# Patient Record
Sex: FEMALE | Race: ASIAN | Employment: FULL TIME | URBAN - METROPOLITAN AREA
[De-identification: names, ages, dates, MRNs, and addresses within clinical notes are randomized per-mention and may not be internally consistent; named-entity substitution may affect disease eponyms.]

---

## 2018-01-04 ENCOUNTER — OFFICE VISIT (OUTPATIENT)
Dept: FAMILY MEDICINE | Facility: CLINIC | Age: 38
End: 2018-01-04
Payer: COMMERCIAL

## 2018-01-04 VITALS
HEART RATE: 81 BPM | SYSTOLIC BLOOD PRESSURE: 102 MMHG | BODY MASS INDEX: 25.03 KG/M2 | DIASTOLIC BLOOD PRESSURE: 63 MMHG | WEIGHT: 136 LBS | TEMPERATURE: 97.4 F | HEIGHT: 62 IN | OXYGEN SATURATION: 98 %

## 2018-01-04 DIAGNOSIS — Z80.41 FAMILY HISTORY OF MALIGNANT NEOPLASM OF OVARY: ICD-10-CM

## 2018-01-04 DIAGNOSIS — Z12.4 CERVICAL CANCER SCREENING: ICD-10-CM

## 2018-01-04 DIAGNOSIS — Z00.00 ENCOUNTER FOR ROUTINE ADULT HEALTH EXAMINATION WITHOUT ABNORMAL FINDINGS: Primary | ICD-10-CM

## 2018-01-04 DIAGNOSIS — Z23 NEED FOR PROPHYLACTIC VACCINATION AND INOCULATION AGAINST INFLUENZA: ICD-10-CM

## 2018-01-04 DIAGNOSIS — Z30.431 SURVEILLANCE FOR BIRTH CONTROL, INTRAUTERINE DEVICE: ICD-10-CM

## 2018-01-04 DIAGNOSIS — N89.8 VAGINAL DISCHARGE: ICD-10-CM

## 2018-01-04 LAB
ALBUMIN SERPL-MCNC: 3.8 G/DL (ref 3.4–5)
ALP SERPL-CCNC: 36 U/L (ref 40–150)
ALT SERPL W P-5'-P-CCNC: 35 U/L (ref 0–50)
ANION GAP SERPL CALCULATED.3IONS-SCNC: 8 MMOL/L (ref 3–14)
AST SERPL W P-5'-P-CCNC: 13 U/L (ref 0–45)
BILIRUB SERPL-MCNC: 0.3 MG/DL (ref 0.2–1.3)
BUN SERPL-MCNC: 14 MG/DL (ref 7–30)
CALCIUM SERPL-MCNC: 8.3 MG/DL (ref 8.5–10.1)
CHLORIDE SERPL-SCNC: 107 MMOL/L (ref 94–109)
CHOLEST SERPL-MCNC: 171 MG/DL
CO2 SERPL-SCNC: 27 MMOL/L (ref 20–32)
CREAT SERPL-MCNC: 0.48 MG/DL (ref 0.52–1.04)
DEPRECATED CALCIDIOL+CALCIFEROL SERPL-MC: 17 UG/L (ref 20–75)
ERYTHROCYTE [DISTWIDTH] IN BLOOD BY AUTOMATED COUNT: 12.7 % (ref 10–15)
GFR SERPL CREATININE-BSD FRML MDRD: >90 ML/MIN/1.7M2
GLUCOSE SERPL-MCNC: 88 MG/DL (ref 70–99)
HCT VFR BLD AUTO: 40.2 % (ref 35–47)
HDLC SERPL-MCNC: 42 MG/DL
HGB BLD-MCNC: 13.5 G/DL (ref 11.7–15.7)
LDLC SERPL CALC-MCNC: 116 MG/DL
MCH RBC QN AUTO: 31.2 PG (ref 26.5–33)
MCHC RBC AUTO-ENTMCNC: 33.6 G/DL (ref 31.5–36.5)
MCV RBC AUTO: 93 FL (ref 78–100)
NONHDLC SERPL-MCNC: 129 MG/DL
PLATELET # BLD AUTO: 286 10E9/L (ref 150–450)
POTASSIUM SERPL-SCNC: 4.4 MMOL/L (ref 3.4–5.3)
PROT SERPL-MCNC: 7.4 G/DL (ref 6.8–8.8)
RBC # BLD AUTO: 4.33 10E12/L (ref 3.8–5.2)
SODIUM SERPL-SCNC: 142 MMOL/L (ref 133–144)
SPECIMEN SOURCE: NORMAL
TRIGL SERPL-MCNC: 66 MG/DL
WBC # BLD AUTO: 4.9 10E9/L (ref 4–11)
WET PREP SPEC: NORMAL

## 2018-01-04 PROCEDURE — G0145 SCR C/V CYTO,THINLAYER,RESCR: HCPCS | Performed by: NURSE PRACTITIONER

## 2018-01-04 PROCEDURE — 87624 HPV HI-RISK TYP POOLED RSLT: CPT | Performed by: NURSE PRACTITIONER

## 2018-01-04 PROCEDURE — 90471 IMMUNIZATION ADMIN: CPT | Performed by: NURSE PRACTITIONER

## 2018-01-04 PROCEDURE — 80061 LIPID PANEL: CPT | Performed by: NURSE PRACTITIONER

## 2018-01-04 PROCEDURE — 87210 SMEAR WET MOUNT SALINE/INK: CPT | Performed by: NURSE PRACTITIONER

## 2018-01-04 PROCEDURE — 82306 VITAMIN D 25 HYDROXY: CPT | Performed by: NURSE PRACTITIONER

## 2018-01-04 PROCEDURE — 80053 COMPREHEN METABOLIC PANEL: CPT | Performed by: NURSE PRACTITIONER

## 2018-01-04 PROCEDURE — 90686 IIV4 VACC NO PRSV 0.5 ML IM: CPT | Performed by: NURSE PRACTITIONER

## 2018-01-04 PROCEDURE — 36415 COLL VENOUS BLD VENIPUNCTURE: CPT | Performed by: NURSE PRACTITIONER

## 2018-01-04 PROCEDURE — 85027 COMPLETE CBC AUTOMATED: CPT | Performed by: NURSE PRACTITIONER

## 2018-01-04 PROCEDURE — 99395 PREV VISIT EST AGE 18-39: CPT | Mod: 25 | Performed by: NURSE PRACTITIONER

## 2018-01-04 NOTE — LETTER
January 11, 2018    Jacqui Walters  [address] [place] [address]    Dear Jacqui,  We are happy to inform you that your PAP smear result from 1/4/18 is normal.  We are now able to do a follow up test on PAP smears. The DNA test is for HPV (Human Papilloma Virus). Cervical cancer is closely linked with certain types of HPV. Your result showed no evidence of high risk HPV.  Therefore we recommend you return in 5 years for your next pap smear and HPV test.  You will still need to return to the clinic every year for an annual exam and other preventive tests.  Please contact the clinic at [phone] with any questions.  Sincerely,    PAM Shah CNP/rlm

## 2018-01-04 NOTE — PATIENT INSTRUCTIONS
At [facility], we strive to deliver an exceptional experience to you, every time we see you.  If you receive a survey in the mail, please send us back your thoughts. We really do value your feedback.    Based on your medical history, these are the current health maintenance/preventive care services that you are due for (some may have been done at this visit.)  Health Maintenance Due   Topic Date Due     EYE EXAM Q1 YEAR  04/15/2017     INFLUENZA VACCINE (SYSTEM ASSIGNED)  09/01/2017     PAP Q5 YEARS  12/19/2017     HPV Q5 YEARS (Complete with PAP)  12/19/2017         Suggested websites for health information:  Www.[place].org : Up to date and easily searchable information on multiple topics.  Www.medlineplus.gov : medication info, interactive tutorials, watch real surgeries online  Www.familydoctor.org : good info from the Academy of Family Physicians  Www.cdc.gov : public health info, travel advisories, epidemics (H1N1)  Www.aap.org : children's health info, normal development, vaccinations  Www.health.[facility].us : [place] dept of health, public health issues in [place], N1N1    Your care team:                            Family Medicine Internal Medicine   MD Manuel Mejia MD Shantel Branch-Fleming, MD Katya Georgiev PA-C Nam Ho, MD Pediatrics   DELMER Pavon, MD Kayli Johnson CNP, MD Deborah Mielke, MD Kim Thein, APRN CNP      Clinic hours: Monday - Thursday 7 am-7 pm; Fridays 7 am-5 pm.   Urgent care: Monday - Friday 11 am-9 pm; Saturday and Sunday 9 am-5 pm.  Pharmacy : Monday -Thursday 8 am-8 pm; Friday 8 am-6 pm; Saturday and Sunday 9 am-5 pm.     Clinic: [phone]   Pharmacy: [phone]      Preventive Health Recommendations  Female Ages 26 - 39  Yearly exam:   See your health care provider every year in order to    Review health changes.     Discuss preventive care.      Review your medicines  if you your doctor has prescribed any.    Until age 30: Get a Pap test every three years (more often if you have had an abnormal result).    After age 30: Talk to your doctor about whether you should have a Pap test every 3 years or have a Pap test with HPV screening every 5 years.   You do not need a Pap test if your uterus was removed (hysterectomy) and you have not had cancer.  You should be tested each year for STDs (sexually transmitted diseases), if you're at risk.   Talk to your provider about how often to have your cholesterol checked.  If you are at risk for diabetes, you should have a diabetes test (fasting glucose).  Shots: Get a flu shot each year. Get a tetanus shot every 10 years.   Nutrition:     Eat at least 5 servings of fruits and vegetables each day.    Eat whole-grain bread, whole-wheat pasta and brown rice instead of white grains and rice.    Talk to your provider about Calcium and Vitamin D.     Lifestyle    Exercise at least 150 minutes a week (30 minutes a day, 5 days of the week). This will help you control your weight and prevent disease.    Limit alcohol to one drink per day.    No smoking.     Wear sunscreen to prevent skin cancer.    See your dentist every six months for an exam and cleaning.

## 2018-01-04 NOTE — MR AVS SNAPSHOT
After Visit Summary   1/4/2018    Jacqui Walters    MRN: [id number]           Patient Information     Date Of Birth          1980        Visit Information        Provider Department      1/4/2018 6:45 AM Salome Russ, PAM GLORIA; [employer] [facility]        Today's Diagnoses     Need for prophylactic vaccination and inoculation against influenza    -  1    Encounter for routine adult health examination without abnormal findings        Cervical cancer screening        Vaginal discharge          Care Instructions    At [facility], we strive to deliver an exceptional experience to you, every time we see you.  If you receive a survey in the mail, please send us back your thoughts. We really do value your feedback.    Based on your medical history, these are the current health maintenance/preventive care services that you are due for (some may have been done at this visit.)  Health Maintenance Due   Topic Date Due     EYE EXAM Q1 YEAR  04/15/2017     INFLUENZA VACCINE (SYSTEM ASSIGNED)  09/01/2017     PAP Q5 YEARS  12/19/2017     HPV Q5 YEARS (Complete with PAP)  12/19/2017         Suggested websites for health information:  Www.[employer] : Up to date and easily searchable information on multiple topics.  Www.medlineplus.gov : medication info, interactive tutorials, watch real surgeries online  Www.familydoctor.org : good info from the Academy of Family Physicians  Www.cdc.gov : public health info, travel advisories, epidemics (H1N1)  Www.aap.org : children's health info, normal development, vaccinations  Www.health.[facility].[place].us : [place] dept of health, public health issues in [place], N1N1    Your care team:                            Family Medicine Internal Medicine   MD Manuel Mejia MD Shantel Branch-Fleming, MD Katya Georgiev PA-C Nam Ho, MD Pediatrics   DELMER Pavon, FAIZAN Jackson  MD Kayli Suggs CNP, MD Deborah Mielke, MD Kim Thein, APRN CNP      Clinic hours: Monday - Thursday 7 am-7 pm; Fridays 7 am-5 pm.   Urgent care: Monday - Friday 11 am-9 pm; Saturday and Sunday 9 am-5 pm.  Pharmacy : Monday -Thursday 8 am-8 pm; Friday 8 am-6 pm; Saturday and Sunday 9 am-5 pm.     Clinic: [phone]   Pharmacy: [phone]      Preventive Health Recommendations  Female Ages 26 - 39  Yearly exam:   See your health care provider every year in order to    Review health changes.     Discuss preventive care.      Review your medicines if you your doctor has prescribed any.    Until age 30: Get a Pap test every three years (more often if you have had an abnormal result).    After age 30: Talk to your doctor about whether you should have a Pap test every 3 years or have a Pap test with HPV screening every 5 years.   You do not need a Pap test if your uterus was removed (hysterectomy) and you have not had cancer.  You should be tested each year for STDs (sexually transmitted diseases), if you're at risk.   Talk to your provider about how often to have your cholesterol checked.  If you are at risk for diabetes, you should have a diabetes test (fasting glucose).  Shots: Get a flu shot each year. Get a tetanus shot every 10 years.   Nutrition:     Eat at least 5 servings of fruits and vegetables each day.    Eat whole-grain bread, whole-wheat pasta and brown rice instead of white grains and rice.    Talk to your provider about Calcium and Vitamin D.     Lifestyle    Exercise at least 150 minutes a week (30 minutes a day, 5 days of the week). This will help you control your weight and prevent disease.    Limit alcohol to one drink per day.    No smoking.     Wear sunscreen to prevent skin cancer.    See your dentist every six months for an exam and cleaning.            Follow-ups after your visit        Who to contact     If you have questions or need follow up information  "about today's clinic visit or your schedule please contact [facility] [address] [place] directly at [phone].  Normal or non-critical lab and imaging results will be communicated to you by Bitcoin Brothershart, letter or phone within 4 business days after the clinic has received the results. If you do not hear from us within 7 days, please contact the clinic through Bitcoin Brothershart or phone. If you have a critical or abnormal lab result, we will notify you by phone as soon as possible.  Submit refill requests through [employer] or call your pharmacy and they will forward the refill request to us. Please allow 3 business days for your refill to be completed.          Additional Information About Your Visit        Bitcoin BrothersharE-Generator Information     [employer] lets you send messages to your doctor, view your test results, renew your prescriptions, schedule appointments and more. To sign up, go to [email or web address] . Click on \"Log in\" on the left side of the screen, which will take you to the Welcome page. Then click on \"Sign up Now\" on the right side of the page.     You will be asked to enter the access code listed below, as well as some personal information. Please follow the directions to create your username and password.     Your access code is: [email or web address]  Expires: 2018  7:26 AM     Your access code will  in 90 days. If you need help or a new code, please call your [place] clinic or [phone].        Care EveryWhere ID     This is your Care EveryWhere ID. This could be used by other organizations to access your [place] medical records  [id number]        Your Vitals Were     Pulse Temperature Height Pulse Oximetry Breastfeeding? BMI (Body Mass Index)    81 97.4  F (36.3  C) (Tympanic) 5' 2.17\" (1.579 m) 98% No 24.74 kg/m2       Blood Pressure from Last 3 Encounters:   18 102/63   16 90/60   07/03/15 105/59    Weight from Last 3 Encounters:   18 136 lb (61.7 kg)   16 133 lb (60.3 kg)   07/03/15 128 " lb (58.1 kg)              We Performed the Following     CBC with platelets     Comprehensive metabolic panel     FLU VAC, SPLIT VIRUS IM > 3 YO (QUADRIVALENT) [24766]     FLU Vaccine, 3 YRS +, Quadrivalent     HPV High Risk Types DNA Cervical     Lipid panel reflex to direct LDL Fasting     Pap imaged thin layer screen with HPV - recommended age 30 - 65 years (select HPV order below)     Vaccine Administration, Initial [52810]     Vitamin D Deficiency     Wet prep        Primary Care Provider Office Phone # Fax #    Elvia Larios Alfie Armendariz [phone]       [address]  [facility] [address]        Equal Access to Services     [facility]: Hadii aad ku hadasho Soomaali, waaxda luqadaha, qaybta kaalmada adeegyada, waxay melanyin hayaan bia varghese . So [facility] [phone].    ATENCIÓN: Si habla español, tiene a blas disposición servicios gratuitos de asistencia lingüística. Llame al [phone].    We comply with applicable federal civil rights laws and [place] laws. We do not discriminate on the basis of race, color, national origin, age, disability, sex, sexual orientation, or gender identity.            Thank you!     Thank you for choosing [facility]  for your care. Our goal is always to provide you with excellent care. Hearing back from our patients is one way we can continue to improve our services. Please take a few minutes to complete the written survey that you may receive in the mail after your visit with us. Thank you!             Your Updated Medication List - Protect others around you: Learn how to safely use, store and throw away your medicines at www.disposemymeds.org.          This list is accurate as of: 1/4/18  7:26 AM.  Always use your most recent med list.                   Brand Name Dispense Instructions for use Diagnosis    fluorometholone 0.1 % ophthalmic susp    FML LIQUIFILM          levocetirizine 5 MG tablet    XYZAL    30  tablet    TAKE 1 TABLET BY MOUTH EVERY EVENING    Non-seasonal allergic rhinitis       paragard intrauterine copper     1 each    1 each by Intrauterine route daily    Routine general medical examination at a health care facility       RESTASIS 0.05 % ophthalmic emulsion   Generic drug:  cycloSPORINE      INT 1 GTT IN OU BID

## 2018-01-04 NOTE — PROGRESS NOTES
SUBJECTIVE:   CC: Jacqui Walters is an 37 year old woman who presents for preventive health visit.     Healthy Habits:    Do you get at least three servings of calcium containing foods daily (dairy, green leafy vegetables, etc.)? yes    Amount of exercise or daily activities, outside of work:None     Problems taking medications regularly No    Medication side effects: No    Have you had an eye exam in the past two years? no    Do you see a dentist twice per year? yes    Do you have sleep apnea, excessive snoring or daytime drowsiness?no    Health history update: Sister recently passed away from ovarian cancer which was discovered at age 40 at an advanced stage.  No other family history of female cancers, colon, or lung cancers.    Patient has IUD- paragaurd placed 2014, likes method.  She denies breakthrough bleeding or very heavy painful periods but cycles are very short (21 days).    perdio every 21 days.        Today's PHQ-2 Score: PHQ-2 ( 1999 Pfizer) 1/4/2018 8/19/2016   Q1: Little interest or pleasure in doing things 0 0   Q2: Feeling down, depressed or hopeless 0 0   PHQ-2 Score 0 0         Abuse: Current or Past(Physical, Sexual or Emotional)- No  Do you feel safe in your environment - No    Social History   Substance Use Topics     Smoking status: Never Smoker     Smokeless tobacco: Never Used     Alcohol use No     If you drink alcohol do you typically have >3 drinks per day or >7 drinks per week? No                     Reviewed orders with patient.  Reviewed health maintenance and updated orders accordingly - Yes  Labs reviewed in EPIC  BP Readings from Last 3 Encounters:   01/04/18 102/63   08/19/16 90/60   07/03/15 105/59    Wt Readings from Last 3 Encounters:   01/04/18 136 lb (61.7 kg)   08/19/16 133 lb (60.3 kg)   07/03/15 128 lb (58.1 kg)                  Patient Active Problem List   Diagnosis     CARDIOVASCULAR SCREENING; LDL GOAL LESS THAN 160     Cough     House dust mite allergy     Diagnostic  skin and sensitization tests (aka ALLERGENS)     Past Surgical History:   Procedure Laterality Date     HC INSERTION INTRAUTERINE DEVICE  2005, 2013    Paragard     HC REMOVE INTRAUTERINE DEVICE  2011       Social History   Substance Use Topics     Smoking status: Never Smoker     Smokeless tobacco: Never Used     Alcohol use No     Family History   Problem Relation Age of Onset     Hypertension Mother      Ovarian Cancer Sister 40     passed away from this     CANCER No family hx of      DIABETES No family hx of      CEREBROVASCULAR DISEASE No family hx of      Thyroid Disease No family hx of      Glaucoma No family hx of      Macular Degeneration No family hx of          Current Outpatient Prescriptions   Medication Sig Dispense Refill     paragard intrauterine copper 1 each by Intrauterine route daily 1 each      levocetirizine (XYZAL) 5 MG tablet TAKE 1 TABLET BY MOUTH EVERY EVENING (Patient not taking: Reported on 1/4/2018) 30 tablet 3     fluorometholone (FML LIQUIFILM) 0.1 % ophthalmic suspension   0     RESTASIS 0.05 % ophthalmic emulsion INT 1 GTT IN OU BID  4     No Known Allergies  Recent Labs   Lab Test  08/19/16   0847  09/03/14   0826  07/30/14   0817   LDL  85   --   66   HDL  39*   --   49*   TRIG  77   --   94   ALT   --    --   21   TSH   --   1.59   --               Mammogram not appropriate for this patient based on age.    Pertinent mammograms are reviewed under the imaging tab.  History of abnormal Pap smear: NO - age 30-65 PAP every 5 years with negative HPV co-testing recommended    Reviewed and updated as needed this visit by clinical staff         Reviewed and updated as needed this visit by Provider        Past Medical History:   Diagnosis Date     Diagnostic skin and sensitization tests (aka ALLERGENS) 1/13/15 IgE tests pos. only to DM (all other environmental allergens were NEGATIVE)     House dust mite allergy     1/13/15 IgE tests pos. only to DM (all other environmental allergens were  NEGATIVE)      Past Surgical History:   Procedure Laterality Date     HC INSERTION INTRAUTERINE DEVICE  ,     Paragard     HC REMOVE INTRAUTERINE DEVICE       Obstetric History       T2      L1     SAB0   TAB0   Ectopic0   Multiple0   Live Births1       # Outcome Date GA Lbr Daniele/2nd Weight Sex Delivery Anes PTL Lv   2 Term 12 39w5d 12:00 8 lb 12 oz (3.969 kg) M  Local        Name: Adma      Apgar1:  8                Apgar5: 9   1 Term  41w0d 04:00 7 lb (3.175 kg) F  None  [facility]      Name: Randi          ROS:  C: NEGATIVE for fever, chills, change in weight  I: NEGATIVE for worrisome rashes, moles or lesions  E: NEGATIVE for vision changes or irritation  ENT: NEGATIVE for ear, mouth and throat problems  R: NEGATIVE for significant cough or SOB  B: NEGATIVE for masses, tenderness or discharge  CV: NEGATIVE for chest pain, palpitations or peripheral edema  GI: NEGATIVE for nausea, abdominal pain, heartburn, or change in bowel habits  : NEGATIVE for unusual urinary or vaginal symptoms. Periods are regular.  M: NEGATIVE for significant arthralgias or myalgia  N: NEGATIVE for weakness, dizziness or paresthesias  E: NEGATIVE for temperature intolerance, skin/hair changes  H: NEGATIVE for bleeding problems  P: NEGATIVE for changes in mood or affect    OBJECTIVE:   There were no vitals taken for this visit.  EXAM:  GENERAL: healthy, alert and no distress  EYES: Eyes grossly normal to inspection, PERRL and conjunctivae and sclerae normal  HENT: ear canals and TM's normal, nose and mouth without ulcers or lesions  NECK: no adenopathy, no asymmetry, masses, or scars and thyroid normal to palpation  RESP: lungs clear to auscultation - no rales, rhonchi or wheezes  BREAST: normal without masses, tenderness or nipple discharge and no palpable axillary masses or adenopathy  CV: regular rate and rhythm, normal S1 S2, no S3 or S4, no murmur, click or rub, no peripheral edema and  "peripheral pulses strong  ABDOMEN: soft, nontender, no hepatosplenomegaly, no masses and bowel sounds normal   (female): normal female external genitalia, normal urethral meatus, vaginal mucosa pink, moist, well rugated, and normal cervix/adnexa/uterus without masses; small amt of clear, non malodorous, thin discharge; white strings 3 cm in length visualized at os  MS: no gross musculoskeletal defects noted, no edema  SKIN: no suspicious lesions or rashes  NEURO: Normal strength and tone, mentation intact and speech normal  PSYCH: mentation appears normal, affect normal/bright    ASSESSMENT/PLAN:   1. Encounter for routine adult health examination without abnormal findings  Normal exam  - Lipid panel reflex to direct LDL Fasting  - Vitamin D Deficiency  - Comprehensive metabolic panel  - CBC with platelets    2. Vaginal discharge  Negative wet prep.  - Wet prep    3. Surveillance for birth control, intrauterine device  strings visualized at os, 3cm in length    4. Family history of malignant neoplasm of ovary  In sister, diagnosed at age 40 at an advanced stage.    5. Cervical cancer screening  Pap done today.  - Pap imaged thin layer screen with HPV - recommended age 30 - 65 years (select HPV order below)  - HPV High Risk Types DNA Cervical    6. Need for prophylactic vaccination and inoculation against influenza  Given.  - FLU VAC, SPLIT VIRUS IM > 3 YO (QUADRIVALENT) [91520]  - Vaccine Administration, Initial [16975]    COUNSELING:   Reviewed preventive health counseling, as reflected in patient instructions       Regular exercise       Healthy diet/nutrition       Immunizations    Vaccinated for: Influenza               reports that she has never smoked. She has never used smokeless tobacco.    Estimated body mass index is 24.33 kg/(m^2) as calculated from the following:    Height as of 8/19/16: 5' 2\" (1.575 m).    Weight as of 8/19/16: 133 lb (60.3 kg).   Weight management plan: Discussed healthy diet and " exercise guidelines and patient will follow up in 12 months in clinic to re-evaluate.    Counseling Resources:  ATP IV Guidelines  Pooled Cohorts Equation Calculator  Breast Cancer Risk Calculator  FRAX Risk Assessment  ICSI Preventive Guidelines  Dietary Guidelines for Americans, 2010  USDA's MyPlate  ASA Prophylaxis  Lung CA Screening    [facility]  Injectable Influenza Immunization Documentation    1.  Is the person to be vaccinated sick today?   No    2. Does the person to be vaccinated have an allergy to a component   of the vaccine?   No  Egg Allergy Algorithm Link    3. Has the person to be vaccinated ever had a serious reaction   to influenza vaccine in the past?   No    4. Has the person to be vaccinated ever had Guillain-Barré syndrome?   No    Form completed by Charles Campos

## 2018-01-04 NOTE — NURSING NOTE
"Chief Complaint   Patient presents with     Physical       Initial /63 (BP Location: Left arm, Patient Position: Sitting, Cuff Size: Adult Regular)  Pulse 81  Temp 97.4  F (36.3  C) (Tympanic)  Ht 5' 2.17\" (1.579 m)  Wt 136 lb (61.7 kg)  SpO2 98%  Breastfeeding? No  BMI 24.74 kg/m2 Estimated body mass index is 24.74 kg/(m^2) as calculated from the following:    Height as of this encounter: 5' 2.17\" (1.579 m).    Weight as of this encounter: 136 lb (61.7 kg).  Medication Reconciliation: complete   Raudel VILLARREAL      "

## 2018-01-06 LAB
COPATH REPORT: NORMAL
PAP: NORMAL

## 2018-01-08 DIAGNOSIS — E55.9 VITAMIN D DEFICIENCY: Primary | ICD-10-CM

## 2018-01-09 LAB
FINAL DIAGNOSIS: NORMAL
HPV HR 12 DNA CVX QL NAA+PROBE: NEGATIVE
HPV16 DNA SPEC QL NAA+PROBE: NEGATIVE
HPV18 DNA SPEC QL NAA+PROBE: NEGATIVE
SPECIMEN DESCRIPTION: NORMAL

## 2018-05-21 ENCOUNTER — OFFICE VISIT (OUTPATIENT)
Dept: FAMILY MEDICINE | Facility: CLINIC | Age: 38
End: 2018-05-21
Payer: COMMERCIAL

## 2018-05-21 VITALS
WEIGHT: 138.6 LBS | HEART RATE: 87 BPM | DIASTOLIC BLOOD PRESSURE: 70 MMHG | SYSTOLIC BLOOD PRESSURE: 108 MMHG | OXYGEN SATURATION: 97 % | BODY MASS INDEX: 25.51 KG/M2 | RESPIRATION RATE: 20 BRPM | TEMPERATURE: 98.6 F | HEIGHT: 62 IN

## 2018-05-21 DIAGNOSIS — E55.9 VITAMIN D DEFICIENCY: Primary | ICD-10-CM

## 2018-05-21 LAB — DEPRECATED CALCIDIOL+CALCIFEROL SERPL-MC: 47 UG/L (ref 20–75)

## 2018-05-21 PROCEDURE — 99213 OFFICE O/P EST LOW 20 MIN: CPT | Performed by: FAMILY MEDICINE

## 2018-05-21 PROCEDURE — 82306 VITAMIN D 25 HYDROXY: CPT | Performed by: FAMILY MEDICINE

## 2018-05-21 PROCEDURE — 36415 COLL VENOUS BLD VENIPUNCTURE: CPT | Performed by: FAMILY MEDICINE

## 2018-05-21 RX ORDER — CHLORAL HYDRATE 500 MG
1 CAPSULE ORAL DAILY
COMMUNITY

## 2018-05-21 ASSESSMENT — PAIN SCALES - GENERAL: PAINLEVEL: NO PAIN (0)

## 2018-05-21 NOTE — PATIENT INSTRUCTIONS
At [facility], we strive to deliver an exceptional experience to you, every time we see you.  If you receive a survey in the mail, please send us back your thoughts. We really do value your feedback.    Based on your medical history, these are the current health maintenance/preventive care services that you are due for (some may have been done at this visit.)  Health Maintenance Due   Topic Date Due     EYE EXAM Q1 YEAR  04/15/2017       Suggested websites for health information:  Www.[facility].org : Up to date and easily searchable information on multiple topics.  Www.medlineplus.gov : medication info, interactive tutorials, watch real surgeries online  Www.familydoctor.org : good info from the Academy of Family Physicians  Www.cdc.gov : public health info, travel advisories, epidemics (H1N1)  Www.aap.org : children's health info, normal development, vaccinations  Www.health.[facility].[place].us : [place] dept of health, public health issues in [place], N1N1    Your care team:                            Family Medicine Internal Medicine   MD Manuel Mejia MD Shantel Branch-Fleming, MD Katya Georgiev PA-C Megan Hill, APRN CNP    Ethan Canales MD Pediatrics   Kalpesh Chavez, PA-C  Salome Russ, CNP MD Samantha Moura APRN CNP   MD Kayli Smyth MD Deborah Mielke, MD Kim Thein, APRN CNP      Clinic hours: Monday - Thursday 7 am-7 pm; Fridays 7 am-5 pm.   Urgent care: Monday - Friday 11 am-9 pm; Saturday and Sunday 9 am-5 pm.  Pharmacy : Monday -Thursday 8 am-8 pm; Friday 8 am-6 pm; Saturday and Sunday 9 am-5 pm.     Clinic: [phone]   Pharmacy: [phone]

## 2018-05-21 NOTE — MR AVS SNAPSHOT
After Visit Summary   5/21/2018    Jacqui Walters    MRN: [id number]           Patient Information     Date Of Birth          1980        Visit Information        Provider Department      5/21/2018 9:05 AM Elvia Martins MD; [employer] [facility]        Today's Diagnoses     Vitamin D deficiency    -  1      Care Instructions    At [facility], we strive to deliver an exceptional experience to you, every time we see you.  If you receive a survey in the mail, please send us back your thoughts. We really do value your feedback.    Based on your medical history, these are the current health maintenance/preventive care services that you are due for (some may have been done at this visit.)  Health Maintenance Due   Topic Date Due     EYE EXAM Q1 YEAR  04/15/2017       Suggested websites for health information:  Www.Face++ : Up to date and easily searchable information on multiple topics.  Www.Validus-IVC.gov : medication info, interactive tutorials, watch real surgeries online  Www.familydoctor.org : good info from the Academy of Family Physicians  Www.cdc.gov : public health info, travel advisories, epidemics (H1N1)  Www.aap.org : children's health info, normal development, vaccinations  Www.health.state.[place].us : [place] dept of health, public health issues in [place], N1N1    Your care team:                            Family Medicine Internal Medicine   MD Manuel Mejia MD Shantel Branch-Fleming, MD Katya Georgiev PA-C Megan Hill, PAM Canales MD Pediatrics   DELMER Pavon, MD Samantha Sims APRN CNP   MD Kayli Smyth MD Deborah Mielke, MD Kim Thein, APRN CNP      Clinic hours: Monday - Thursday 7 am-7 pm; Fridays 7 am-5 pm.   Urgent care: Monday - Friday 11 am-9 pm; Saturday and Sunday 9 am-5 pm.  Pharmacy : Monday -Thursday 8 am-8 pm; Friday 8  "am-6 pm; Saturday and  9 am-5 pm.     Clinic: [phone]   Pharmacy: [phone]             Follow-ups after your visit        Who to contact     If you have questions or need follow up information about today's clinic visit or your schedule please contact [facility] directly at [phone].  Normal or non-critical lab and imaging results will be communicated to you by MyChart, letter or phone within 4 business days after the clinic has received the results. If you do not hear from us within 7 days, please contact the clinic through [employer] or phone. If you have a critical or abnormal lab result, we will notify you by phone as soon as possible.  Submit refill requests through [employer] or call your pharmacy and they will forward the refill request to us. Please allow 3 business days for your refill to be completed.          Additional Information About Your Visit        [facility] [employer] Information     [employer] lets you send messages to your doctor, view your test results, renew your prescriptions, schedule appointments and more. To sign up, go to [email or web address] . Click on \"Log in\" on the left side of the screen, which will take you to the Welcome page. Then click on \"Sign up Now\" on the right side of the page.     You will be asked to enter the access code listed below, as well as some personal information. Please follow the directions to create your username and password.     Your access code is: [email or web address]  Expires: 2018  9:40 AM     Your access code will  in 90 days. If you need help or a new code, please call your [facility] clinic or [phone].        Care EveryWhere ID     This is your Care EveryWhere ID. This could be used by other organizations to access your [facility] medical records  [id number]        Your Vitals Were     Pulse Temperature Respirations Height Pulse Oximetry Breastfeeding?    87 98.6  F (37  C) (Oral) 20 5' 2.17\" (1.579 m) 97% No    BMI (Body " Mass Index)                   25.22 kg/m2            Blood Pressure from Last 3 Encounters:   05/21/18 108/70   01/04/18 102/63   08/19/16 90/60    Weight from Last 3 Encounters:   05/21/18 138 lb 9.6 oz (62.9 kg)   01/04/18 136 lb (61.7 kg)   08/19/16 133 lb (60.3 kg)              We Performed the Following     Vitamin D Deficiency        Primary Care Provider Office Phone # Fax Ivania Larios Alfie Armendariz [phone]       [address]  [facility] [address]        Equal Access to Services     [facility]: Hadii aad ku hadasho Soomaali, waaxda luqadaha, qaybta kaalmada adeegyada, waxromain varghese . So [facility] [phone].    ATENCIÓN: Si habla español, tiene a blas disposición servicios gratuitos de asistencia lingüística. Llame al [phone].    We comply with applicable federal civil rights laws and [place] laws. We do not discriminate on the basis of race, color, national origin, age, disability, sex, sexual orientation, or gender identity.            Thank you!     Thank you for choosing [facility]  for your care. Our goal is always to provide you with excellent care. Hearing back from our patients is one way we can continue to improve our services. Please take a few minutes to complete the written survey that you may receive in the mail after your visit with us. Thank you!             Your Updated Medication List - Protect others around you: Learn how to safely use, store and throw away your medicines at www.disposemymeds.org.          This list is accurate as of 5/21/18  9:40 AM.  Always use your most recent med list.                   Brand Name Dispense Instructions for use Diagnosis    fish oil-omega-3 fatty acids 1000 MG capsule      Take 1 g by mouth daily        fluorometholone 0.1 % ophthalmic susp    FML LIQUIFILM          levocetirizine 5 MG tablet    XYZAL    30 tablet    TAKE 1 TABLET BY MOUTH EVERY EVENING     Non-seasonal allergic rhinitis       paragard intrauterine copper     1 each    1 each by Intrauterine route daily    Routine general medical examination at a health care facility       RESTASIS 0.05 % ophthalmic emulsion   Generic drug:  cycloSPORINE      INT 1 GTT IN OU BID

## 2018-05-21 NOTE — PROGRESS NOTES
SUBJECTIVE:   Jacqui Walters is a 37 year old female who presents to clinic today for the following health issues:    Follow up Vitamin D: Took the weekly 50,000 units but did not start daily supplement to follow.    Hyperlipidemia Follow-Up      Rate your low fat/cholesterol diet?: good    Taking statin?  No    Other lipid medications/supplements?:  Fish oil/Omega 3, dose none without side effects      Amount of exercise or physical activity: None    Problems taking medications regularly: No, not taking any medications    Medication side effects: none    Diet: regular (no restrictions)        Problem list and histories reviewed & adjusted, as indicated.  Additional history: as documented    Patient Active Problem List   Diagnosis     CARDIOVASCULAR SCREENING; LDL GOAL LESS THAN 160     Cough     House dust mite allergy     Diagnostic skin and sensitization tests (aka ALLERGENS)     Family history of malignant neoplasm of ovary     Vitamin D deficiency     Past Surgical History:   Procedure Laterality Date     HC INSERTION INTRAUTERINE DEVICE  2005, 2013    Paragard     HC REMOVE INTRAUTERINE DEVICE  2011       Social History   Substance Use Topics     Smoking status: Never Smoker     Smokeless tobacco: Never Used     Alcohol use No     Family History   Problem Relation Age of Onset     Hypertension Mother      Ovarian Cancer Sister 40     passed away from this     CANCER No family hx of      DIABETES No family hx of      CEREBROVASCULAR DISEASE No family hx of      Thyroid Disease No family hx of      Glaucoma No family hx of      Macular Degeneration No family hx of            Reviewed and updated as needed this visit by clinical staff  Tobacco  Allergies  Meds  Problems       Reviewed and updated as needed this visit by Provider  Allergies  Meds  Problems         ROS:  Constitutional, HEENT, cardiovascular, pulmonary, gi and gu systems are negative, except as otherwise noted.    OBJECTIVE:     /70  "(BP Location: Left arm, Patient Position: Chair, Cuff Size: Adult Regular)  Pulse 87  Temp 98.6  F (37  C) (Oral)  Resp 20  Ht 5' 2.17\" (1.579 m)  Wt 138 lb 9.6 oz (62.9 kg)  SpO2 97%  Breastfeeding? No  BMI 25.22 kg/m2  Body mass index is 25.22 kg/(m^2).  GENERAL: healthy, alert and no distress  NECK: no adenopathy, no asymmetry, masses, or scars and thyroid normal to palpation  RESP: lungs clear to auscultation - no rales, rhonchi or wheezes  CV: regular rate and rhythm, normal S1 S2, no S3 or S4, no murmur, click or rub, no peripheral edema and peripheral pulses strong  ABDOMEN: soft, nontender, no hepatosplenomegaly, no masses and bowel sounds normal  MS: no gross musculoskeletal defects noted, no edema  PSYCH: mentation appears normal, affect normal/bright    Diagnostic Test Results:  none     ASSESSMENT/PLAN:     1. Vitamin D deficiency  Uncertain control/resolution - recheck and treatment as indicated.  - Vitamin D Deficiency      Elvia Armendariz MD  [facility]    "

## 2018-05-22 NOTE — PROGRESS NOTES
Ms. Walters,    Your vitamin D level is normal.  Please take vitamin D3 1000 units daily for here on.    Please contact the clinic if you have additional questions.  Thank you.    Sincerely,    Elvia Armendariz

## 2019-06-20 ENCOUNTER — OFFICE VISIT (OUTPATIENT)
Dept: FAMILY MEDICINE | Facility: CLINIC | Age: 39
End: 2019-06-20
Payer: COMMERCIAL

## 2019-06-20 VITALS
BODY MASS INDEX: 26.13 KG/M2 | HEART RATE: 77 BPM | RESPIRATION RATE: 18 BRPM | TEMPERATURE: 98 F | HEIGHT: 62 IN | SYSTOLIC BLOOD PRESSURE: 116 MMHG | WEIGHT: 142 LBS | OXYGEN SATURATION: 97 % | DIASTOLIC BLOOD PRESSURE: 80 MMHG

## 2019-06-20 DIAGNOSIS — J30.1 NON-SEASONAL ALLERGIC RHINITIS DUE TO POLLEN: ICD-10-CM

## 2019-06-20 DIAGNOSIS — Z13.29 SCREENING FOR THYROID DISORDER: ICD-10-CM

## 2019-06-20 DIAGNOSIS — E55.9 VITAMIN D DEFICIENCY: ICD-10-CM

## 2019-06-20 DIAGNOSIS — Z00.00 ROUTINE GENERAL MEDICAL EXAMINATION AT A HEALTH CARE FACILITY: Primary | ICD-10-CM

## 2019-06-20 DIAGNOSIS — Z80.41 FAMILY HISTORY OF MALIGNANT NEOPLASM OF OVARY: ICD-10-CM

## 2019-06-20 DIAGNOSIS — H10.13 ACUTE ALLERGIC CONJUNCTIVITIS OF BOTH EYES: ICD-10-CM

## 2019-06-20 LAB
ANION GAP SERPL CALCULATED.3IONS-SCNC: 5 MMOL/L (ref 3–14)
BUN SERPL-MCNC: 10 MG/DL (ref 7–30)
CALCIUM SERPL-MCNC: 8.9 MG/DL (ref 8.5–10.1)
CHLORIDE SERPL-SCNC: 105 MMOL/L (ref 94–109)
CHOLEST SERPL-MCNC: 148 MG/DL
CO2 SERPL-SCNC: 29 MMOL/L (ref 20–32)
CREAT SERPL-MCNC: 0.52 MG/DL (ref 0.52–1.04)
DEPRECATED CALCIDIOL+CALCIFEROL SERPL-MC: 20 UG/L (ref 20–75)
GFR SERPL CREATININE-BSD FRML MDRD: >90 ML/MIN/{1.73_M2}
GLUCOSE SERPL-MCNC: 94 MG/DL (ref 70–99)
HDLC SERPL-MCNC: 44 MG/DL
LDLC SERPL CALC-MCNC: 85 MG/DL
NONHDLC SERPL-MCNC: 104 MG/DL
POTASSIUM SERPL-SCNC: 4.4 MMOL/L (ref 3.4–5.3)
SODIUM SERPL-SCNC: 139 MMOL/L (ref 133–144)
TRIGL SERPL-MCNC: 96 MG/DL
TSH SERPL DL<=0.005 MIU/L-ACNC: 1.63 MU/L (ref 0.4–4)

## 2019-06-20 PROCEDURE — 80048 BASIC METABOLIC PNL TOTAL CA: CPT | Performed by: PHYSICIAN ASSISTANT

## 2019-06-20 PROCEDURE — 36415 COLL VENOUS BLD VENIPUNCTURE: CPT | Performed by: PHYSICIAN ASSISTANT

## 2019-06-20 PROCEDURE — 82306 VITAMIN D 25 HYDROXY: CPT | Performed by: PHYSICIAN ASSISTANT

## 2019-06-20 PROCEDURE — 99395 PREV VISIT EST AGE 18-39: CPT | Performed by: PHYSICIAN ASSISTANT

## 2019-06-20 PROCEDURE — 80061 LIPID PANEL: CPT | Performed by: PHYSICIAN ASSISTANT

## 2019-06-20 PROCEDURE — 84443 ASSAY THYROID STIM HORMONE: CPT | Performed by: PHYSICIAN ASSISTANT

## 2019-06-20 RX ORDER — LEVOCETIRIZINE DIHYDROCHLORIDE 5 MG/1
5 TABLET, FILM COATED ORAL EVERY EVENING
Qty: 90 TABLET | Refills: 3 | Status: SHIPPED | OUTPATIENT
Start: 2019-06-20

## 2019-06-20 RX ORDER — CHOLECALCIFEROL (VITAMIN D3) 50 MCG
1 TABLET ORAL DAILY
Qty: 90 TABLET | Refills: 3 | Status: SHIPPED | OUTPATIENT
Start: 2019-06-20

## 2019-06-20 RX ORDER — OLOPATADINE HYDROCHLORIDE 1 MG/ML
1 SOLUTION/ DROPS OPHTHALMIC 2 TIMES DAILY
Qty: 5 ML | Refills: 3 | Status: SHIPPED | OUTPATIENT
Start: 2019-06-20

## 2019-06-20 ASSESSMENT — PAIN SCALES - GENERAL: PAINLEVEL: NO PAIN (0)

## 2019-06-20 ASSESSMENT — MIFFLIN-ST. JEOR: SCORE: 1276.33

## 2019-06-20 NOTE — PATIENT INSTRUCTIONS
At [facility], we strive to deliver an exceptional experience to you, every time we see you.  If you receive a survey in the mail, please send us back your thoughts. We really do value your feedback.    Based on your medical history, these are the current health maintenance/preventive care services that you are due for (some may have been done at this visit.)  Health Maintenance Due   Topic Date Due     EYE EXAM  04/15/2017     PHQ-2  01/01/2019     PREVENTIVE CARE VISIT  01/04/2019         Suggested websites for health information:  Www.[employer] HealthCiiNOW.org : Up to date and easily searchable information on multiple topics.  Www.Your Policy Manager.gov : medication info, interactive tutorials, watch real surgeries online  Www.familydoctor.org : good info from the Academy of Family Physicians  Www.cdc.gov : public health info, travel advisories, epidemics (H1N1)  Www.aap.org : children's health info, normal development, vaccinations  Www.health.[facility].[place].us : [place] dept of health, public health issues in [place], N1N1    Your care team:                            Family Medicine Internal Medicine   MD Manuel Mejia MD Shantel Branch-Fleming, MD Katya Georgiev PA-C Nam Ho, MD Pediatrics   DELMER Pavon, FAIZAN OROZCO CNP   MD Kayli Smyth MD Deborah Mielke, MD Kim Thein, APRN [facility]      Clinic hours: Monday - Thursday 7 am-7 pm; Fridays 7 am-5 pm.   Urgent care: Monday - Friday 11 am-9 pm; Saturday and Sunday 9 am-5 pm.  Pharmacy : Monday -Thursday 8 am-8 pm; Friday 8 am-6 pm; Saturday and Sunday 9 am-5 pm.     Clinic: [phone]   Pharmacy: [phone]      Preventive Health Recommendations  Female Ages 26 - 39  Yearly exam:   See your health care provider every year in order to    Review health changes.     Discuss preventive care.      Review your medicines if you your doctor has prescribed any.    Until age 30: Get a Pap test every  three years (more often if you have had an abnormal result).    After age 30: Talk to your doctor about whether you should have a Pap test every 3 years or have a Pap test with HPV screening every 5 years.   You do not need a Pap test if your uterus was removed (hysterectomy) and you have not had cancer.  You should be tested each year for STDs (sexually transmitted diseases), if you're at risk.   Talk to your provider about how often to have your cholesterol checked.  If you are at risk for diabetes, you should have a diabetes test (fasting glucose).  Shots: Get a flu shot each year. Get a tetanus shot every 10 years.   Nutrition:     Eat at least 5 servings of fruits and vegetables each day.    Eat whole-grain bread, whole-wheat pasta and brown rice instead of white grains and rice.    Get adequate Calcium and Vitamin D.     Lifestyle    Exercise at least 150 minutes a week (30 minutes a day, 5 days of the week). This will help you control your weight and prevent disease.    Limit alcohol to one drink per day.    No smoking.     Wear sunscreen to prevent skin cancer.    See your dentist every six months for an exam and cleaning.

## 2019-06-20 NOTE — PROGRESS NOTES
SUBJECTIVE:   CC: Jacqui Walters is an 39 year old woman who presents for preventive health visit.     Healthy Habits:    Do you get at least three servings of calcium containing foods daily (dairy, green leafy vegetables, etc.)? yes    Amount of exercise or daily activities, outside of work: none    Problems taking medications regularly No    Medication side effects: No    Have you had an eye exam in the past two years? no    Do you see a dentist twice per year? yes    Do you have sleep apnea, excessive snoring or daytime drowsiness?no      ALLERGIES      Duration: chronic    Description:   Nasal congestion: no   Sneezing: YES  Red, itchy eyes: YES    Accompanying signs and symptoms: none    History (similar episodes/allergy testing): None    Precipitating or alleviating factors: all year round allergies    Therapies tried and outcome: visine eye drops do not help      Today's PHQ-2 Score:   PHQ-2 ( 1999 Pfizer) 6/20/2019 5/21/2018   Q1: Little interest or pleasure in doing things 0 0   Q2: Feeling down, depressed or hopeless 0 0   PHQ-2 Score 0 0       Abuse: Current or Past(Physical, Sexual or Emotional)- No  Do you feel safe in your environment? Yes    Social History     Tobacco Use     Smoking status: Never Smoker     Smokeless tobacco: Never Used   Substance Use Topics     Alcohol use: No     If you drink alcohol do you typically have >3 drinks per day or >7 drinks per week? No                     Reviewed orders with patient.  Reviewed health maintenance and updated orders accordingly - Yes  Lab work is in process    Mammogram not appropriate for this patient based on age.    Pertinent mammograms are reviewed under the imaging tab.  History of abnormal Pap smear: NO - age 30-65 PAP every 5 years with negative HPV co-testing recommended  PAP / HPV Latest Ref Rng & Units 1/4/2018 12/19/2012 6/20/2011   PAP - NIL NIL NIL   HPV 16 DNA NEG:Negative Negative - -   HPV 18 DNA NEG:Negative Negative - -   OTHER HR  "HPV NEG:Negative Negative - -     Reviewed and updated as needed this visit by clinical staff  Tobacco  Allergies  Meds  Med Hx  Surg Hx  Fam Hx  Soc Hx        Reviewed and updated as needed this visit by Provider            ROS:  CONSTITUTIONAL: NEGATIVE for fever, chills, change in weight  INTEGUMENTARU/SKIN: NEGATIVE for worrisome rashes, moles or lesions  EYES: NEGATIVE for vision changes or irritation  ENT: NEGATIVE for ear, mouth and throat problems  RESP: NEGATIVE for significant cough or SOB  BREAST: NEGATIVE for masses, tenderness or discharge  CV: NEGATIVE for chest pain, palpitations or peripheral edema  GI: NEGATIVE for nausea, abdominal pain, heartburn, or change in bowel habits  : NEGATIVE for unusual urinary or vaginal symptoms. Periods are regular.  MUSCULOSKELETAL: NEGATIVE for significant arthralgias or myalgia  NEURO: NEGATIVE for weakness, dizziness or paresthesias  PSYCHIATRIC: NEGATIVE for changes in mood or affect    OBJECTIVE:   /80 (BP Location: Right arm, Patient Position: Chair, Cuff Size: Adult Regular)   Pulse 77   Temp 98  F (36.7  C) (Oral)   Resp 18   Ht 1.581 m (5' 2.25\")   Wt 64.4 kg (142 lb)   SpO2 97%   BMI 25.76 kg/m    EXAM:  GENERAL: healthy, alert and no distress  EYES: PERRL, visual fields normal and conjunctiva/corneas- conjunctival injection OU  HENT: ear canals and TM's normal, nose and mouth without ulcers or lesions  NECK: no adenopathy, no asymmetry, masses, or scars and thyroid normal to palpation  RESP: lungs clear to auscultation - no rales, rhonchi or wheezes  CV: regular rate and rhythm, normal S1 S2, no S3 or S4, no murmur, click or rub, no peripheral edema and peripheral pulses strong  ABDOMEN: soft, nontender, no hepatosplenomegaly, no masses and bowel sounds normal  MS: no gross musculoskeletal defects noted, no edema  SKIN: no suspicious lesions or rashes  NEURO: Normal strength and tone, mentation intact and speech normal  PSYCH: " mentation appears normal, affect normal/bright    Diagnostic Test Results:  Labs reviewed in Epic  Results for orders placed or performed in visit on 19 (from the past 24 hour(s))   Lipid Profile (Chol, Trig, HDL, LDL calc)   Result Value Ref Range    Cholesterol 148 <200 mg/dL    Triglycerides 96 <150 mg/dL    HDL Cholesterol 44 (L) >49 mg/dL    LDL Cholesterol Calculated 85 <100 mg/dL    Non HDL Cholesterol 104 <130 mg/dL   Basic metabolic panel  (Ca, Cl, CO2, Creat, Gluc, K, Na, BUN)   Result Value Ref Range    Sodium 139 133 - 144 mmol/L    Potassium 4.4 3.4 - 5.3 mmol/L    Chloride 105 94 - 109 mmol/L    Carbon Dioxide 29 20 - 32 mmol/L    Anion Gap 5 3 - 14 mmol/L    Glucose 94 70 - 99 mg/dL    Urea Nitrogen 10 7 - 30 mg/dL    Creatinine 0.52 0.52 - 1.04 mg/dL    GFR Estimate >90 >60 mL/min/[1.73_m2]    GFR Estimate If Black >90 >60 mL/min/[1.73_m2]    Calcium 8.9 8.5 - 10.1 mg/dL   TSH with free T4 reflex   Result Value Ref Range    TSH 1.63 0.40 - 4.00 mU/L       ASSESSMENT/PLAN:       ICD-10-CM    1. Routine general medical examination at a health care facility Z00.00 Lipid Profile (Chol, Trig, HDL, LDL calc)     Basic metabolic panel  (Ca, Cl, CO2, Creat, Gluc, K, Na, BUN)   2. Non-seasonal allergic rhinitis due to pollen J30.1 OPTOMETRY REFERRAL     levocetirizine (XYZAL) 5 MG tablet   3. Vitamin D deficiency E55.9 Vitamin D Deficiency     vitamin D3 (CHOLECALCIFEROL) 2000 units (50 mcg) tablet   4. Family history of malignant neoplasm of ovary Z80.41 CANCELED: US Pelvic Complete w Transvaginal   5. Screening for thyroid disorder Z13.29 TSH with free T4 reflex   6. Acute allergic conjunctivitis of both eyes H10.13 olopatadine (PATANOL) 0.1 % ophthalmic solution   continue Xyzal  Start Patanol eye drops 1 drop daily to both eyes  Restart vit D 2000 units daily     Patient discussed f/o of ovarian cancer in sister,  at 42.  I have offered pelvic US and Ca-125 as a screening measure, but patient  "declined due to high deductible.    COUNSELING:   Reviewed preventive health counseling, as reflected in patient instructions       Regular exercise       Healthy diet/nutrition    Estimated body mass index is 25.76 kg/m  as calculated from the following:    Height as of this encounter: 1.581 m (5' 2.25\").    Weight as of this encounter: 64.4 kg (142 lb).         reports that she has never smoked. She has never used smokeless tobacco.      Counseling Resources:  ATP IV Guidelines  Pooled Cohorts Equation Calculator  Breast Cancer Risk Calculator  FRAX Risk Assessment  ICSI Preventive Guidelines  Dietary Guidelines for Americans, 2010  USDA's MyPlate  ASA Prophylaxis  Lung CA Screening    Dulce Berg PA-C  [facility]  "

## 2019-06-20 NOTE — LETTER
June 24, 2019      Jacqui Walters  [address] [place] [address]        Dear Jacqui Walters    Blood work is within normal limits. Continue vit D 2,000 units daily to maintain normal levels.      Enclosed is a copy of the results.  It was a pleasure to see you at your last appointment.    If you have any questions or concerns, please call myself or my nurse at [phone].      Sincerely,      MIRANDA Comer/KASIE Bose MA      Results for orders placed or performed in visit on 06/20/19   Vitamin D Deficiency   Result Value Ref Range    Vitamin D Deficiency screening 20 20 - 75 ug/L   Lipid Profile (Chol, Trig, HDL, LDL calc)   Result Value Ref Range    Cholesterol 148 <200 mg/dL    Triglycerides 96 <150 mg/dL    HDL Cholesterol 44 (L) >49 mg/dL    LDL Cholesterol Calculated 85 <100 mg/dL    Non HDL Cholesterol 104 <130 mg/dL   Basic metabolic panel  (Ca, Cl, CO2, Creat, Gluc, K, Na, BUN)   Result Value Ref Range    Sodium 139 133 - 144 mmol/L    Potassium 4.4 3.4 - 5.3 mmol/L    Chloride 105 94 - 109 mmol/L    Carbon Dioxide 29 20 - 32 mmol/L    Anion Gap 5 3 - 14 mmol/L    Glucose 94 70 - 99 mg/dL    Urea Nitrogen 10 7 - 30 mg/dL    Creatinine 0.52 0.52 - 1.04 mg/dL    GFR Estimate >90 >60 mL/min/[1.73_m2]    GFR Estimate If Black >90 >60 mL/min/[1.73_m2]    Calcium 8.9 8.5 - 10.1 mg/dL   TSH with free T4 reflex   Result Value Ref Range    TSH 1.63 0.40 - 4.00 mU/L

## 2019-11-11 ENCOUNTER — OFFICE VISIT (OUTPATIENT)
Dept: OPTOMETRY | Facility: CLINIC | Age: 39
End: 2019-11-11
Payer: COMMERCIAL

## 2019-11-11 DIAGNOSIS — H10.45 CHRONIC ALLERGIC CONJUNCTIVITIS: ICD-10-CM

## 2019-11-11 DIAGNOSIS — H52.13 MYOPIA OF BOTH EYES: ICD-10-CM

## 2019-11-11 DIAGNOSIS — Z01.01 ENCOUNTER FOR EXAMINATION OF EYES AND VISION WITH ABNORMAL FINDINGS: Primary | ICD-10-CM

## 2019-11-11 PROCEDURE — 92004 COMPRE OPH EXAM NEW PT 1/>: CPT | Performed by: OPTOMETRIST

## 2019-11-11 PROCEDURE — 92015 DETERMINE REFRACTIVE STATE: CPT | Performed by: OPTOMETRIST

## 2019-11-11 ASSESSMENT — VISUAL ACUITY
OD_SC: 20/40
METHOD: SNELLEN - LINEAR
OD_SC: 20/40
OS_SC: 20/40
OD_SC+: -1
OS_SC: 20/40

## 2019-11-11 ASSESSMENT — CUP TO DISC RATIO
OD_RATIO: 0.35
OS_RATIO: 0.35

## 2019-11-11 ASSESSMENT — TONOMETRY
IOP_METHOD: APPLANATION
OS_IOP_MMHG: 10
OD_IOP_MMHG: 12

## 2019-11-11 ASSESSMENT — REFRACTION_MANIFEST
OD_CYLINDER: SPHERE
OS_CYLINDER: +0.50
OD_SPHERE: -1.50
OD_SPHERE: -0.75
OS_SPHERE: -0.75
METHOD_AUTOREFRACTION: 1
OS_SPHERE: -1.50
OS_AXIS: 115
OS_CYLINDER: SPHERE
OD_ADD: +1.00
OS_ADD: +1.00
OD_CYLINDER: SPHERE

## 2019-11-11 ASSESSMENT — CONF VISUAL FIELD
OS_NORMAL: 1
OD_NORMAL: 1

## 2019-11-11 ASSESSMENT — EXTERNAL EXAM - LEFT EYE: OS_EXAM: NORMAL

## 2019-11-11 ASSESSMENT — EXTERNAL EXAM - RIGHT EYE: OD_EXAM: NORMAL

## 2019-11-11 ASSESSMENT — SLIT LAMP EXAM - LIDS
COMMENTS: NORMAL
COMMENTS: NORMAL

## 2019-11-11 NOTE — LETTER
"    11/11/2019         RE: Jacqui Walters  [address] [place] [address]        Dear Colleague,    Thank you for referring your patient, Jacqui Walters, to the [facility]. Please see a copy of my visit note below.    Chief Complaint   Patient presents with     Annual Eye Exam      Accompanied by   Last Eye Exam: 3 years  Dilated Previously: Yes    What are you currently using to see?  does not use glasses or contacts       Distance Vision Acuity: Noticed gradual change in both eyes driving at night not as clear daytime ok    Near Vision Acuity: Satisfied with vision while reading  unaided    Eye Comfort: itchy  Do you use eye drops? : Yes: patanol relief for a little bit  Occupation or Hobbies: nail tech    Dorothy Chávez Optometric Assistant, A.B.O.C.          Medical, surgical and family histories reviewed and updated 11/11/2019.       OBJECTIVE: See Ophthalmology exam    ASSESSMENT:    ICD-10-CM    1. Encounter for examination of eyes and vision with abnormal findings Z01.01 EYE EXAM (SIMPLE-NONBILLABLE)   2. Chronic allergic conjunctivitis H10.45 EYE EXAM (SIMPLE-NONBILLABLE)   3. Myopia of both eyes H52.13 EYE EXAM (SIMPLE-NONBILLABLE)     REFRACTION      PLAN:     Patient Instructions   Okay to continue use of Patanol- 1 drop 2x daily in each eye- to improve itching.   If only temporary relief with Patanol, can try using ketotifen fumarate, sold over the counter as Alaway or Zaditor. These are also to be used 1 drop 2x daily in each eye.     I recommend using artificial tears for your dry eye. There are over the counter drops that work well and may be used up to 4 x daily (Systane , Refresh, Thera Tears)- avoid \"get the red out\" drops. If you need more than 4 drops daily, use a preservative free product which come in individual vials and may be used for 24 hours until finished and discarded.    Jacqui was advised of today's exam findings.  Fill glasses prescription  Allow 2 " weeks to adapt to change in glasses  Copy of glasses Rx provided today.  Return in 1 year for eye exam, or sooner if needed.    The effects of the dilating drops last for 4- 6 hours.  You will be more sensitive to light and vision will be blurry up close.  Mydriatic sunglasses were given if needed.      Joe Hall O.D.  [address]  [place]  [address]    [phone]             Again, thank you for allowing me to participate in the care of your patient.        Sincerely,        Joe Hall, OD

## 2019-11-11 NOTE — PATIENT INSTRUCTIONS
"Okay to continue use of Patanol- 1 drop 2x daily in each eye- to improve itching.   If only temporary relief with Patanol, can try using ketotifen fumarate, sold over the counter as Alaway or Zaditor. These are also to be used 1 drop 2x daily in each eye.     I recommend using artificial tears for your dry eye. There are over the counter drops that work well and may be used up to 4 x daily (Systane , Refresh, Thera Tears)- avoid \"get the red out\" drops. If you need more than 4 drops daily, use a preservative free product which come in individual vials and may be used for 24 hours until finished and discarded.    Jacqui was advised of today's exam findings.  Fill glasses prescription  Allow 2 weeks to adapt to change in glasses  Copy of glasses Rx provided today.  Return in 1 year for eye exam, or sooner if needed.    The effects of the dilating drops last for 4- 6 hours.  You will be more sensitive to light and vision will be blurry up close.  Mydriatic sunglasses were given if needed.      Joe Hall O.D.  [facility] [place] [place]  [address]    [phone]      "

## 2019-11-11 NOTE — PROGRESS NOTES
"Chief Complaint   Patient presents with     Annual Eye Exam      Accompanied by   Last Eye Exam: 3 years  Dilated Previously: Yes    What are you currently using to see?  does not use glasses or contacts       Distance Vision Acuity: Noticed gradual change in both eyes driving at night not as clear daytime ok    Near Vision Acuity: Satisfied with vision while reading  unaided    Eye Comfort: itchy  Do you use eye drops? : Yes: patanol relief for a little bit  Occupation or Hobbies: nail tech    Dorothy Chávez Optometric Assistant, A.BLinnetteOLinnetteCLinnette          Medical, surgical and family histories reviewed and updated 11/11/2019.       OBJECTIVE: See Ophthalmology exam    ASSESSMENT:    ICD-10-CM    1. Encounter for examination of eyes and vision with abnormal findings Z01.01 EYE EXAM (SIMPLE-NONBILLABLE)   2. Chronic allergic conjunctivitis H10.45 EYE EXAM (SIMPLE-NONBILLABLE)   3. Myopia of both eyes H52.13 EYE EXAM (SIMPLE-NONBILLABLE)     REFRACTION      PLAN:     Patient Instructions   Okay to continue use of Patanol- 1 drop 2x daily in each eye- to improve itching.   If only temporary relief with Patanol, can try using ketotifen fumarate, sold over the counter as Alaway or Zaditor. These are also to be used 1 drop 2x daily in each eye.     I recommend using artificial tears for your dry eye. There are over the counter drops that work well and may be used up to 4 x daily (Systane , Refresh, Thera Tears)- avoid \"get the red out\" drops. If you need more than 4 drops daily, use a preservative free product which come in individual vials and may be used for 24 hours until finished and discarded.    Jacqui was advised of today's exam findings.  Fill glasses prescription  Allow 2 weeks to adapt to change in glasses  Copy of glasses Rx provided today.  Return in 1 year for eye exam, or sooner if needed.    The effects of the dilating drops last for 4- 6 hours.  You will be more sensitive to light and vision will be blurry " up close.  Mydriatic sunglasses were given if needed.      Joe Hall O.D.  [address]  [place]  [address]    [phone]

## 2021-03-18 ENCOUNTER — IMMUNIZATION (OUTPATIENT)
Dept: PEDIATRICS | Facility: CLINIC | Age: 41
End: 2021-03-18
Payer: COMMERCIAL

## 2021-03-18 PROCEDURE — 0001A PR COVID VAC PFIZER DIL RECON 30 MCG/0.3 ML IM: CPT

## 2021-03-18 PROCEDURE — 91300 PR COVID VAC PFIZER DIL RECON 30 MCG/0.3 ML IM: CPT

## 2021-04-08 ENCOUNTER — IMMUNIZATION (OUTPATIENT)
Dept: PEDIATRICS | Facility: CLINIC | Age: 41
End: 2021-04-08
Attending: INTERNAL MEDICINE
Payer: COMMERCIAL

## 2021-04-08 PROCEDURE — 91300 PR COVID VAC PFIZER DIL RECON 30 MCG/0.3 ML IM: CPT

## 2021-04-08 PROCEDURE — 0002A PR COVID VAC PFIZER DIL RECON 30 MCG/0.3 ML IM: CPT

## 2021-04-10 ENCOUNTER — HEALTH MAINTENANCE LETTER (OUTPATIENT)
Age: 41
End: 2021-04-10

## 2021-09-25 ENCOUNTER — HEALTH MAINTENANCE LETTER (OUTPATIENT)
Age: 41
End: 2021-09-25

## 2022-05-01 ENCOUNTER — HEALTH MAINTENANCE LETTER (OUTPATIENT)
Age: 42
End: 2022-05-01

## 2022-12-26 ENCOUNTER — HEALTH MAINTENANCE LETTER (OUTPATIENT)
Age: 42
End: 2022-12-26

## 2023-06-02 ENCOUNTER — HEALTH MAINTENANCE LETTER (OUTPATIENT)
Age: 43
End: 2023-06-02

## 2024-02-04 ENCOUNTER — HEALTH MAINTENANCE LETTER (OUTPATIENT)
Age: 44
End: 2024-02-04